# Patient Record
Sex: FEMALE | Race: WHITE | HISPANIC OR LATINO | Employment: FULL TIME | URBAN - METROPOLITAN AREA
[De-identification: names, ages, dates, MRNs, and addresses within clinical notes are randomized per-mention and may not be internally consistent; named-entity substitution may affect disease eponyms.]

---

## 2023-02-10 ENCOUNTER — OFFICE VISIT (OUTPATIENT)
Dept: OBSTETRICS AND GYNECOLOGY | Facility: CLINIC | Age: 33
End: 2023-02-10
Payer: COMMERCIAL

## 2023-02-10 VITALS
WEIGHT: 129 LBS | SYSTOLIC BLOOD PRESSURE: 120 MMHG | BODY MASS INDEX: 23.74 KG/M2 | DIASTOLIC BLOOD PRESSURE: 67 MMHG | HEIGHT: 62 IN

## 2023-02-10 DIAGNOSIS — R63.4 WEIGHT LOSS: ICD-10-CM

## 2023-02-10 DIAGNOSIS — Z00.00 ANNUAL PHYSICAL EXAM: Primary | ICD-10-CM

## 2023-02-10 PROCEDURE — 3008F BODY MASS INDEX DOCD: CPT | Performed by: OBSTETRICS & GYNECOLOGY

## 2023-02-10 PROCEDURE — 99385 PREV VISIT NEW AGE 18-39: CPT | Performed by: OBSTETRICS & GYNECOLOGY

## 2023-02-10 PROCEDURE — 2014F MENTAL STATUS ASSESS: CPT | Performed by: OBSTETRICS & GYNECOLOGY

## 2023-02-10 NOTE — PROGRESS NOTES
HPI   Megan Gómez  is a 32 y.o. female who presents to [facility] and have a routine gynecologic exam.  Her last exam was 3 years ago when her baby was born.  She has a Mirena IUD in place which is her method of contraception.  She is amenorrheic with her Mirena.  She is concerned because she has lost 15 to 20 pounds and feels fatigued.  She would like to have a blood test for anemia today.    Chief Complaint   Patient presents with   • New Gyn     Patient is here for a new gyn annual.       History reviewed. No pertinent past medical history.    History reviewed. No pertinent surgical history.    Social History     Socioeconomic History   • Marital status:    Tobacco Use   • Smoking status: Never   • Smokeless tobacco: Never   Vaping Use   • Vaping Use: Never used   Substance and Sexual Activity   • Alcohol use: Never   • Drug use: Never   • Sexual activity: Yes     Partners: Male     Birth control/protection: I.U.D.       The following portions of the patient's history were reviewed and updated as appropriate: allergies, current medications, past family history, past medical history, past social history, past surgical history and problem list.    Review of Systems   Constitutional: Negative.    HENT: Negative.    Respiratory: Negative.    Cardiovascular: Negative.    Gastrointestinal: Negative.    Genitourinary: Negative.    Musculoskeletal: Negative.    Skin: Negative.    Allergic/Immunologic: Negative.    Psychiatric/Behavioral: Negative.              Physical Exam  Vitals and nursing note reviewed.   Constitutional:       Appearance: She is well-developed.   HENT:      Head: Normocephalic and atraumatic.   Cardiovascular:      Rate and Rhythm: Normal rate and regular rhythm.   Pulmonary:      Effort: Pulmonary effort is normal.      Breath sounds: Normal breath sounds. No wheezing or rales.   Chest:      Comments: The breasts are homogeneous.  There are no palpable lumps.  Nipple discharge and  axillary adenopathy are absent.  Abdominal:      General: There is no distension.      Palpations: Abdomen is soft.      Tenderness: There is no abdominal tenderness.   Genitourinary:     Labia:         Right: No lesion.         Left: No lesion.       Vagina: Normal. No vaginal discharge.      Cervix: No cervical motion tenderness.      Uterus: Normal. Not enlarged and not tender.       Adnexa:         Right: No mass or tenderness.          Left: No mass or tenderness.     Skin:     General: Skin is warm and dry.   Neurological:      Mental Status: She is alert and oriented to person, place, and time.         Assessment    Diagnoses and all orders for this visit:    1. Annual physical exam (Primary)    2. Weight loss  -     CBC & Differential        Plan  1. Annual examination performed  2. Contraceptive counseling.  The patient had a Mirena IUD placed 3 years ago.  She would like to discuss alternative options.  We discussed the benefits of it and risks of oral contraceptive pills.  We also discussed the procedure of laparoscopic tubal cautery along with its benefits and risks at the patient's request.  She will consider her options.  She plans to continue her Mirena for now.  3. Fatigue and unexplained weight loss.  The patient is concerned regarding possible anemia.  She would like to do a hemoglobin.  I will order a CBC.  Further recommendations pending the results of this study.    4. Return in about 1 year (around 2/10/2024).    Social History     Tobacco Use   Smoking Status Never   Smokeless Tobacco Never   5.

## 2023-02-11 LAB
BASOPHILS # BLD AUTO: 0.06 10*3/MM3 (ref 0–0.2)
BASOPHILS NFR BLD AUTO: 1 % (ref 0–1.5)
EOSINOPHIL # BLD AUTO: 0.05 10*3/MM3 (ref 0–0.4)
EOSINOPHIL NFR BLD AUTO: 0.8 % (ref 0.3–6.2)
ERYTHROCYTE [DISTWIDTH] IN BLOOD BY AUTOMATED COUNT: 12.2 % (ref 12.3–15.4)
HCT VFR BLD AUTO: 39 % (ref 34–46.6)
HGB BLD-MCNC: 12.7 G/DL (ref 12–15.9)
IMM GRANULOCYTES # BLD AUTO: 0.01 10*3/MM3 (ref 0–0.05)
IMM GRANULOCYTES NFR BLD AUTO: 0.2 % (ref 0–0.5)
LYMPHOCYTES # BLD AUTO: 2.24 10*3/MM3 (ref 0.7–3.1)
LYMPHOCYTES NFR BLD AUTO: 36.6 % (ref 19.6–45.3)
MCH RBC QN AUTO: 30.2 PG (ref 26.6–33)
MCHC RBC AUTO-ENTMCNC: 32.6 G/DL (ref 31.5–35.7)
MCV RBC AUTO: 92.6 FL (ref 79–97)
MONOCYTES # BLD AUTO: 0.42 10*3/MM3 (ref 0.1–0.9)
MONOCYTES NFR BLD AUTO: 6.9 % (ref 5–12)
NEUTROPHILS # BLD AUTO: 3.34 10*3/MM3 (ref 1.7–7)
NEUTROPHILS NFR BLD AUTO: 54.5 % (ref 42.7–76)
NRBC BLD AUTO-RTO: 0 /100 WBC (ref 0–0.2)
PLATELET # BLD AUTO: 279 10*3/MM3 (ref 140–450)
RBC # BLD AUTO: 4.21 10*6/MM3 (ref 3.77–5.28)
WBC # BLD AUTO: 6.12 10*3/MM3 (ref 3.4–10.8)

## 2023-02-14 ENCOUNTER — PATIENT MESSAGE (OUTPATIENT)
Dept: OBSTETRICS AND GYNECOLOGY | Facility: CLINIC | Age: 33
End: 2023-02-14
Payer: COMMERCIAL

## 2023-02-14 ENCOUNTER — PATIENT ROUNDING (BHMG ONLY) (OUTPATIENT)
Dept: OBSTETRICS AND GYNECOLOGY | Facility: CLINIC | Age: 33
End: 2023-02-14
Payer: COMMERCIAL

## 2023-02-14 NOTE — PROGRESS NOTES
My chart message has been sent to the patient for PATIENT ROUNDING with [facility] [place].

## 2023-02-17 LAB
CONV .: ABNORMAL
CYTOLOGIST CVX/VAG CYTO: ABNORMAL
CYTOLOGY CVX/VAG DOC CYTO: ABNORMAL
CYTOLOGY CVX/VAG DOC THIN PREP: ABNORMAL
DX ICD CODE: ABNORMAL
DX ICD CODE: ABNORMAL
HIV 1 & 2 AB SER-IMP: ABNORMAL
HPV I/H RISK 4 DNA CVX QL PROBE+SIG AMP: NEGATIVE
OTHER STN SPEC: ABNORMAL
PATHOLOGIST CVX/VAG CYTO: ABNORMAL
STAT OF ADQ CVX/VAG CYTO-IMP: ABNORMAL

## 2023-02-20 ENCOUNTER — TELEPHONE (OUTPATIENT)
Dept: OBSTETRICS AND GYNECOLOGY | Facility: CLINIC | Age: 33
End: 2023-02-20
Payer: COMMERCIAL

## 2023-02-20 NOTE — TELEPHONE ENCOUNTER
Can you contact the pt to discuss the results he left? It looks like he sent a message to you regarding these results on Friday afternoon.

## 2023-02-20 NOTE — TELEPHONE ENCOUNTER
PT IS CALLING TO SPEAK WITH A NURSE REGARDING HER REUSLTS FROM HER PAP ON 2/10/23, PLEASE ADVISE PT.

## 2023-02-20 NOTE — TELEPHONE ENCOUNTER
----- Message from Danny Christie MD sent at 2/17/2023  4:02 PM EST -----  Please contact the patient and let her know that her Pap showed atypical cells but HPV testing was negative.  I recommend a repeat Pap 6 weeks postpartum.  Thank you

## 2023-08-25 ENCOUNTER — OFFICE VISIT (OUTPATIENT)
Dept: OBSTETRICS AND GYNECOLOGY | Facility: CLINIC | Age: 33
End: 2023-08-25
Payer: COMMERCIAL

## 2023-08-25 VITALS
SYSTOLIC BLOOD PRESSURE: 122 MMHG | BODY MASS INDEX: 24.29 KG/M2 | HEIGHT: 62 IN | WEIGHT: 132 LBS | DIASTOLIC BLOOD PRESSURE: 67 MMHG

## 2023-08-25 DIAGNOSIS — R87.610 ASCUS OF CERVIX WITH NEGATIVE HIGH RISK HPV: Primary | ICD-10-CM

## 2023-08-25 NOTE — PROGRESS NOTES
HPI   Megan Gómez  is a 32 y.o. female who presents for evaluation of a Pap which showed ASCUS with negative HPV.  Initially, the plan was to repeat the Pap today.  I counseled the patient and answered her questions.  She is very concerned about this because she has experienced several atypical Paps over the last few years.  She would like to proceed with colposcopy.  We discussed the procedure as well as its benefits and risks.  I answered the patient's questions and she would like to proceed    Chief Complaint   Patient presents with    Follow-up     Patient is here for a f/u for a repeat pap.       History reviewed. No pertinent past medical history.    History reviewed. No pertinent surgical history.    Social History     Socioeconomic History    Marital status:    Tobacco Use    Smoking status: Never    Smokeless tobacco: Never   Vaping Use    Vaping Use: Never used   Substance and Sexual Activity    Alcohol use: Never    Drug use: Never    Sexual activity: Yes     Partners: Male     Birth control/protection: I.U.D.       The following portions of the patient's history were reviewed and updated as appropriate: allergies, current medications, past family history, past medical history, past social history, past surgical history and problem list.    Review of Systems          Physical Exam  Vitals and nursing note reviewed.   Genitourinary:           Comments: Colposcopy was performed with good visualization of the transformation zone.  IUD string is in good position.  There is mild acetowhite epithelium at the 12 o'clock position.  Biopsy performed.  This was treated with Monsel solution to hemostasis.  The patient tolerated the procedure well.      Assessment    Diagnoses and all orders for this visit:    1. ASCUS of cervix with negative high risk HPV (Primary)        Plan  Colposcopy with biopsy was performed as described above.  The patient tolerated the procedure well.    No follow-ups on  file.    Social History     Tobacco Use   Smoking Status Never   Smokeless Tobacco Never

## 2023-08-30 ENCOUNTER — TELEPHONE (OUTPATIENT)
Dept: OBSTETRICS AND GYNECOLOGY | Facility: CLINIC | Age: 33
End: 2023-08-30
Payer: COMMERCIAL

## 2023-08-30 LAB
DX ICD CODE: NORMAL
PATH REPORT.FINAL DX SPEC: NORMAL
PATH REPORT.GROSS SPEC: NORMAL
PATH REPORT.SITE OF ORIGIN SPEC: NORMAL
PATHOLOGIST NAME: NORMAL
PAYMENT PROCEDURE: NORMAL

## 2023-08-30 NOTE — TELEPHONE ENCOUNTER
Please help the patient to schedule an appointment so we can discuss her concerns.  I believe it would be very difficult to have a satisfactory conversation over the phone, especially with the need for an .  Thank you.

## 2023-08-30 NOTE — TELEPHONE ENCOUNTER
Phone call.  Biopsy results were reviewed and discussed with the patient.  I answered her questions.  She understands that she has a very mild abnormality of the cervix.  We discussed the clinical significance of this along with my recommendations for repeat Pap in 6 months.  The patient understands and has already scheduled her repeat Pap.

## 2023-08-30 NOTE — TELEPHONE ENCOUNTER
----- Message from Megan Gómez sent at 8/30/2023  3:43 PM EDT -----  Regarding: Don't understand test results   Contact: [phone]  Good afternoon Dr Chrau Christie . I know you are very busy . I am sorry for bother you. I just need y you explain the biopsy results. I don't understand it . I called and someone told me need to come back in 6 months and repeat the test. Please send me a message whenever you have a chance. Thank you so much and enjoy your long weekend!

## 2023-08-30 NOTE — TELEPHONE ENCOUNTER
Pt aware of repeat pap results and has scheduled for repeat pap in 6 months. Pt requesting to speak with provider for better understanding of low-grade precancerous change of cervix please.    Thank you,   Ginna

## 2023-08-30 NOTE — TELEPHONE ENCOUNTER
Pt speaks english, would you be able to call pt and explain a bit better what low-grade precancerous is? Pt is already scheduled for repeat pap, I was unable to provide her with medical definition of what this means for her understanding. An  is not needed

## 2023-09-06 ENCOUNTER — TELEPHONE (OUTPATIENT)
Dept: OBSTETRICS AND GYNECOLOGY | Facility: CLINIC | Age: 33
End: 2023-09-06
Payer: COMMERCIAL

## 2023-09-06 NOTE — TELEPHONE ENCOUNTER
----- Message from Danny Christie MD sent at 8/30/2023  1:07 PM EDT -----  Please contact the patient and let her know that her biopsy showed a very low-grade precancerous change of the cervix.  70% of these resolve without treatment.  I recommend a repeat Pap in 6 months.  Please help the patient to schedule this.  Thank you.

## 2023-12-27 ENCOUNTER — TELEPHONE (OUTPATIENT)
Dept: OBSTETRICS AND GYNECOLOGY | Facility: CLINIC | Age: 33
End: 2023-12-27
Payer: COMMERCIAL

## 2023-12-27 NOTE — TELEPHONE ENCOUNTER
MAK MILLER    [phone] PLEASE LEAVE DETAIL MSG      PT IS SCHEDULED FOR A 6mo PAP FOR FEB BUT PT INSURANCE WILL END AT THE END OF JANUARY .     PLEASE ADVISE WOULD INSURANCE STILL COVER IF WE RESCHEDULE FOR JANUARY.

## 2024-01-25 ENCOUNTER — OFFICE VISIT (OUTPATIENT)
Dept: OBSTETRICS AND GYNECOLOGY | Facility: CLINIC | Age: 34
End: 2024-01-25
Payer: COMMERCIAL

## 2024-01-25 VITALS
BODY MASS INDEX: 24.84 KG/M2 | HEART RATE: 74 BPM | DIASTOLIC BLOOD PRESSURE: 63 MMHG | WEIGHT: 135 LBS | HEIGHT: 62 IN | SYSTOLIC BLOOD PRESSURE: 101 MMHG

## 2024-01-25 DIAGNOSIS — Z30.431 IUD CHECK UP: ICD-10-CM

## 2024-01-25 DIAGNOSIS — Z00.00 ANNUAL PHYSICAL EXAM: Primary | ICD-10-CM

## 2024-01-25 DIAGNOSIS — R87.619 ABNORMAL CERVICAL PAPANICOLAOU SMEAR, UNSPECIFIED ABNORMAL PAP FINDING: ICD-10-CM

## 2024-01-25 RX ORDER — LEVONORGESTREL 52 MG/1
1 INTRAUTERINE DEVICE INTRAUTERINE
COMMUNITY

## 2024-01-25 NOTE — PROGRESS NOTES
HPI   Megan Gómez  is a 33 y.o. female who presents for 2 reasons.  First, we have been following an abnormality of the patient's Pap.  She had atypical cells of undetermined significance.  Colposcopically directed biopsy did not specifically find CAMRYN, but did find koilocytosis.  Pap will be repeated today.  Next, the patient is due for an annual examination.  She has a Mirena IUD and is satisfied with this.  It is 4 years old.    Chief Complaint   Patient presents with    Abnormal Pap Smear     Pt here for repeat pap smear, also wants to talk about possibly replacing Mirena IUD       History reviewed. No pertinent past medical history.    History reviewed. No pertinent surgical history.    Social History     Socioeconomic History    Marital status:    Tobacco Use    Smoking status: Never    Smokeless tobacco: Never   Vaping Use    Vaping Use: Never used   Substance and Sexual Activity    Alcohol use: Never    Drug use: Never    Sexual activity: Yes     Partners: Male     Birth control/protection: I.U.D.       The following portions of the patient's history were reviewed and updated as appropriate: allergies, current medications, past family history, past medical history, past social history, past surgical history and problem list.    Review of Systems   Constitutional: Negative.    HENT: Negative.     Respiratory: Negative.     Cardiovascular: Negative.    Gastrointestinal: Negative.    Genitourinary: Negative.    Musculoskeletal: Negative.    Skin: Negative.    Allergic/Immunologic: Negative.    Psychiatric/Behavioral: Negative.               Physical Exam  Vitals and nursing note reviewed.   Constitutional:       Appearance: She is well-developed.   HENT:      Head: Normocephalic and atraumatic.   Cardiovascular:      Rate and Rhythm: Normal rate and regular rhythm.   Pulmonary:      Effort: Pulmonary effort is normal.      Breath sounds: Normal breath sounds. No wheezing or rales.   Chest:       Comments: The breasts are homogeneous.  There are no palpable lumps.  Nipple discharge and axillary adenopathy are absent.  Abdominal:      General: There is no distension.      Palpations: Abdomen is soft.      Tenderness: There is no abdominal tenderness.   Genitourinary:     Labia:         Right: No lesion.         Left: No lesion.       Vagina: Normal. No vaginal discharge.      Cervix: No cervical motion tenderness.      Uterus: Normal. Not enlarged and not tender.       Adnexa:         Right: No mass or tenderness.          Left: No mass or tenderness.     Skin:     General: Skin is warm and dry.   Neurological:      Mental Status: She is alert and oriented to person, place, and time.         Assessment    Diagnoses and all orders for this visit:    1. Annual physical exam (Primary)    2. Abnormal cervical Papanicolaou smear, unspecified abnormal pap finding    3. IUD check up  -     US Non-ob Transvaginal; Future        Plan  Annual examination performed  IUD string is not visible on exam.  I recommend an ultrasound to further assess this.  Abnormal Paps.  The patient has a history of atypical Pap smears.  Last biopsy showed koilocytosis.  We discussed the clinical significance of this.  While it is an abnormality, it does not reach the level of CMARYN-1.  We discussed the pathophysiology of this condition and risks for worsening versus the likelihood of improvement.  Pap was repeated today.  If this is negative, I recommend a repeat Pap in 6 months.  If both are negative, the patient can return to yearly screening.  If it continues to be atypical, options include continued cytologic follow-up versus a destructive procedure such as cervical cryotherapy versus a LEEP.  We discussed these options with further recommendations pending the results of the patient's Pap.    Return in about 6 months (around 7/25/2024).    Social History     Tobacco Use   Smoking Status Never   Smokeless Tobacco Never

## 2024-01-31 LAB
CYTOLOGIST CVX/VAG CYTO: ABNORMAL
CYTOLOGY CVX/VAG DOC CYTO: ABNORMAL
CYTOLOGY CVX/VAG DOC THIN PREP: ABNORMAL
DX ICD CODE: ABNORMAL
DX ICD CODE: ABNORMAL
HIV 1 & 2 AB SER-IMP: ABNORMAL
HPV I/H RISK 4 DNA CVX QL PROBE+SIG AMP: NEGATIVE
OTHER STN SPEC: ABNORMAL
PATHOLOGIST CVX/VAG CYTO: ABNORMAL
STAT OF ADQ CVX/VAG CYTO-IMP: ABNORMAL